# Patient Record
Sex: MALE | Race: WHITE | NOT HISPANIC OR LATINO | Employment: OTHER | URBAN - METROPOLITAN AREA
[De-identification: names, ages, dates, MRNs, and addresses within clinical notes are randomized per-mention and may not be internally consistent; named-entity substitution may affect disease eponyms.]

---

## 2021-01-06 ENCOUNTER — TRANSCRIBE ORDERS (OUTPATIENT)
Dept: PHYSICAL THERAPY | Facility: CLINIC | Age: 61
End: 2021-01-06

## 2021-01-06 DIAGNOSIS — M75.22 BICEPS TENDONITIS OF BOTH SHOULDERS: Primary | ICD-10-CM

## 2021-01-06 DIAGNOSIS — M75.21 BICEPS TENDONITIS OF BOTH SHOULDERS: Primary | ICD-10-CM

## 2021-08-31 ENCOUNTER — OFFICE VISIT (OUTPATIENT)
Dept: PULMONOLOGY | Facility: CLINIC | Age: 61
End: 2021-08-31

## 2021-08-31 VITALS
HEIGHT: 71 IN | WEIGHT: 206 LBS | BODY MASS INDEX: 28.84 KG/M2 | OXYGEN SATURATION: 97 % | DIASTOLIC BLOOD PRESSURE: 90 MMHG | TEMPERATURE: 98.4 F | SYSTOLIC BLOOD PRESSURE: 130 MMHG | HEART RATE: 80 BPM

## 2021-08-31 DIAGNOSIS — R04.2 HEMOPTYSIS: ICD-10-CM

## 2021-08-31 DIAGNOSIS — R05.3 CHRONIC COUGH: Primary | ICD-10-CM

## 2021-08-31 DIAGNOSIS — J98.11 ATELECTASIS: ICD-10-CM

## 2021-08-31 PROCEDURE — 99204 OFFICE O/P NEW MOD 45 MIN: CPT | Performed by: INTERNAL MEDICINE

## 2021-08-31 RX ORDER — ARIPIPRAZOLE 2 MG/1
2 TABLET ORAL EVERY OTHER DAY
COMMUNITY
Start: 2021-08-28

## 2021-08-31 RX ORDER — DULOXETIN HYDROCHLORIDE 60 MG/1
CAPSULE, DELAYED RELEASE ORAL
COMMUNITY

## 2021-08-31 RX ORDER — ATORVASTATIN CALCIUM 40 MG/1
TABLET, FILM COATED ORAL
COMMUNITY
Start: 2021-03-01

## 2021-08-31 RX ORDER — LORAZEPAM 1 MG/1
1 TABLET ORAL DAILY PRN
COMMUNITY
Start: 2021-07-02

## 2021-08-31 RX ORDER — HYDROGEN PEROXIDE 2.65 ML/100ML
81 LIQUID ORAL; TOPICAL DAILY
COMMUNITY
Start: 2021-08-28

## 2021-08-31 RX ORDER — CLOPIDOGREL BISULFATE 75 MG/1
TABLET ORAL
COMMUNITY
Start: 2021-08-29

## 2021-08-31 RX ORDER — LOSARTAN POTASSIUM 100 MG/1
100 TABLET ORAL DAILY
COMMUNITY
Start: 2021-08-23

## 2021-08-31 NOTE — PROGRESS NOTES
Derik Agarwal is a 61 y.o. male here for evaluation of   Chief Complaint   Patient presents with   • atelactasis       Problem list:  1. Hemoptysis  2. Chronic cough  3. CVA, 2021, [facility], no sequelae  4. Hypertension  5. Dyslipidemia  6. Biceps tendon tear, left  7. Depression/anxiety  8. Cataract extraction, right  9. Basal cell cancer, scalp, excision  10. Tonsillectomy  11. [place] teeth extraction  12. Latex allergy, itching and swelling  13. Family history of colon cancer    History of Present Illness    61-year-old gentleman, non-smoker, vaccinated against COVID-19 referred here for a possible episode of hemoptysis.  He reports a chronic productive cough for at least 40 years.  More recently in his sputum has changed to green and yellow but is is most often clear.  He was evaluated by his primary care physician on August 12 at 3 coughed up some cherry colored sputum.  He had 4-5 episodes over a 10-days.  He denies fever, chills, night sweats, weight loss, pleurisy.  He thinks he had some wheezing when he was a kid but he outgrew it.  Even as a child he never used inhalers.  He has not never had wheezing as an adult.  His parents were both smokers so he had secondhand smoke as a child.  He denies TB exposure.  He was raised in [place] and lived in [place] for 20 years and moved to [place] in 2009.  He does not have any children.  He has no history of blood clots.  He does not take any anticoagulants except Plavix since he had a stroke.      Review of Systems   Constitutional: Positive for fatigue. Negative for chills and fever.   HENT: Positive for congestion and postnasal drip. Negative for sore throat and voice change.    Eyes:        Cataract on left   Respiratory: Positive for cough. Negative for wheezing.    Cardiovascular: Negative for chest pain, palpitations and leg swelling.   Gastrointestinal: Positive for diarrhea. Negative for blood in stool.   Endocrine: Negative.    Genitourinary:  Positive for difficulty urinating.   Musculoskeletal: Positive for arthralgias.   Skin: Positive for rash.   Neurological: Positive for dizziness and headaches.   Hematological: Bruises/bleeds easily.   Psychiatric/Behavioral: Positive for dysphoric mood. The patient is nervous/anxious.          Current Outpatient Medications:   •  ARIPiprazole (ABILIFY) 2 MG tablet, Take 2 mg by mouth Every Other Day., Disp: , Rfl:   •  atorvastatin (LIPITOR) 40 MG tablet, , Disp: , Rfl:   •  clopidogrel (PLAVIX) 75 MG tablet, , Disp: , Rfl:   •  DULoxetine (Cymbalta) 60 MG capsule, , Disp: , Rfl:   •  EQ Aspirin Adult Low Dose 81 MG EC tablet, Take 81 mg by mouth Daily., Disp: , Rfl:   •  LORazepam (ATIVAN) 1 MG tablet, Take 1 mg by mouth Daily As Needed., Disp: , Rfl:   •  losartan (COZAAR) 100 MG tablet, Take 100 mg by mouth Daily., Disp: , Rfl:   •  metoprolol tartrate (LOPRESSOR) 25 MG tablet, TAKE 1 2 (ONE HALF) TABLET BY MOUTH TWICE DAILY, Disp: , Rfl:     Past Medical History:   Diagnosis Date   • Anxiety    • Biceps tendon tear, left    • CVA (cerebral vascular accident) (CMS/[facility]) 2021    unmonitor BP, SJE   • Depression    • Hyperlipidemia    • Hypertension      Past Surgical History:   Procedure Laterality Date   • CATARACT EXTRACTION Right    • SKIN CANCER EXCISION      scalp basal cell   • TONSILLECTOMY     • WISDOM TOOTH EXTRACTION       Social History     Socioeconomic History   • Marital status:      Spouse name: Not on file   • Number of children: 0   • Years of education: Not on file   • Highest education level: Not on file   Tobacco Use   • Smoking status: Never Smoker   • Smokeless tobacco: Never Used   Substance and Sexual Activity   • Alcohol use: Yes     Comment: very rare   • Drug use: Never   • Sexual activity: Defer     Family History   Problem Relation Age of Onset   • Hypertension Mother    • Coronary artery disease Mother    • Colon cancer Mother    • Coronary artery disease Father    •  "Hypertension Father    • Stroke Father    • Hypertension Sister    • Hypertension Brother    • Liver cancer Brother    • Lung cancer Brother    • Colon cancer Sister      Blood pressure 130/90, pulse 80, temperature 98.4 °F (36.9 °C), height 180.3 cm (71\"), weight 93.4 kg (206 lb), SpO2 97 %.    Physical Exam  Constitutional:       Appearance: Normal appearance.   HENT:      Head: Normocephalic and atraumatic.      Nose: No congestion.      Mouth/Throat:      Mouth: Mucous membranes are moist.      Pharynx: Oropharynx is clear.   Eyes:      General:         Right eye: No discharge.      Pupils: Pupils are equal, round, and reactive to light.   Neck:      Comments: No palpable thyroid  Cardiovascular:      Rate and Rhythm: Normal rate and regular rhythm.      Pulses: Normal pulses.      Heart sounds: Normal heart sounds. No murmur heard.     Pulmonary:      Effort: Pulmonary effort is normal.      Comments: Rare expiratory rhonchi with forced expiration posterior chest  Abdominal:      Palpations: Abdomen is soft.   Musculoskeletal:      Right lower leg: No edema.      Left lower leg: No edema.   Lymphadenopathy:      Cervical: No cervical adenopathy.   Skin:     General: Skin is warm and dry.   Neurological:      General: No focal deficit present.      Mental Status: He is alert and oriented to person, place, and time.   Psychiatric:         Mood and Affect: Mood normal.           PFTs:    Radiology:  Chest x-ray August 19, 2020 reviewed and lungs are well and related with possible chronic scarring in the left retrocardiac area and some minimal platelike atelectasis at the right costophrenic angle.  Heart is not enlarged and there is no visible adenopathy  Lab:  August 12, 2021, white count 5000, hemoglobin 12.7, hematocrit 39, platelet count 301, 73 polys, free T4 0.9, TSH 3.55, PTT 27, total cholesterol 117, triglycerides 163, elevated, HDL direct 29.6, low    Diagnoses and all orders for this visit:    1. Chronic " cough (Primary)    2. Hemoptysis    3. Atelectasis        Discussion:     61-year-old gentleman referred for possible hemoptysis.  Sputum was described as a cherry colored but not obvious blood.  In the setting of a chronic productive cough most of his adult life I am suspicious that he may have some bronchiectasis.  He does not appear toxic and has not had fever, chills, night sweats, weight loss to suggest a chronic infection..  He is not bringing up half a cup to a cup of sputum per day more like a tablespoon.  Clinically he does not have signs or symptoms to suggest embolic disease.  I do not hear a murmur to suggest mitral valve disease.  Chest x-ray does have some faint bibasilar atelectasis, worse in the left base.  He has had no further episodes in the last week.  I do not feel he  clinically requires antibiotics at this time.  Likewise, he has lived with his chronic cough for 40 years and has not required frequent antibiotics therefore I do not think he has enough trouble mobilizing secretions that he would need an inhaler.    CT scan of the chest for chronic cough and hemoptysis to look at the lower lobes specifically and evaluate for bronchiectasis.  I not see any evidence of a cavity to suggest an aspergilloma.  I do not see a chronic cavitary infiltrate to suggest a granulomatous infection.    Follow-up in 2 weeks after CT scan and at that time depending on what the CAT scan shows I can perform additional tests      Carmen Brewer MD

## 2021-09-01 DIAGNOSIS — R04.2 HEMOPTYSIS: Primary | ICD-10-CM

## 2021-09-01 DIAGNOSIS — R05.3 CHRONIC COUGH: ICD-10-CM

## 2021-09-03 ENCOUNTER — TRANSCRIBE ORDERS (OUTPATIENT)
Dept: PULMONOLOGY | Facility: CLINIC | Age: 61
End: 2021-09-03

## 2021-09-03 RX ORDER — CEPHALEXIN 500 MG/1
500 CAPSULE ORAL 3 TIMES DAILY
Qty: 21 CAPSULE | Refills: 0 | Status: SHIPPED | OUTPATIENT
Start: 2021-09-03 | End: 2021-09-10

## 2021-09-03 NOTE — PROGRESS NOTES
Patient called staff and had several minutes of reddish-brown sputum this morning.  It was not all shruti blood.  Therefore I will call in an antibiotic Keflex 500 mg 3 times daily for 7 days.

## 2021-09-10 DIAGNOSIS — R04.2 HEMOPTYSIS: ICD-10-CM

## 2021-09-22 ENCOUNTER — TELEPHONE (OUTPATIENT)
Dept: PULMONOLOGY | Facility: CLINIC | Age: 61
End: 2021-09-22

## 2021-09-22 NOTE — TELEPHONE ENCOUNTER
Patient called wanting to know results of most recent CT scan, Report is located in Media. -- Please advise.

## 2021-09-28 ENCOUNTER — OFFICE VISIT (OUTPATIENT)
Dept: PULMONOLOGY | Facility: CLINIC | Age: 61
End: 2021-09-28

## 2021-09-28 VITALS
SYSTOLIC BLOOD PRESSURE: 126 MMHG | OXYGEN SATURATION: 94 % | DIASTOLIC BLOOD PRESSURE: 78 MMHG | HEART RATE: 92 BPM | HEIGHT: 71 IN | WEIGHT: 208 LBS | RESPIRATION RATE: 20 BRPM | BODY MASS INDEX: 29.12 KG/M2

## 2021-09-28 DIAGNOSIS — R04.2 HEMOPTYSIS: ICD-10-CM

## 2021-09-28 DIAGNOSIS — J47.9 BRONCHIECTASIS WITHOUT COMPLICATION (HCC): ICD-10-CM

## 2021-09-28 DIAGNOSIS — R05.3 CHRONIC COUGH: Primary | ICD-10-CM

## 2021-09-28 DIAGNOSIS — Z00.6 EXAMINATION FOR NORMAL COMPARISON OR CONTROL IN CLINICAL RESEARCH: Primary | ICD-10-CM

## 2021-09-28 PROBLEM — J98.11 ATELECTASIS: Status: RESOLVED | Noted: 2021-08-31 | Resolved: 2021-09-28

## 2021-09-28 PROCEDURE — 99213 OFFICE O/P EST LOW 20 MIN: CPT | Performed by: INTERNAL MEDICINE

## 2021-09-28 NOTE — PROGRESS NOTES
Derik Agarwal is a 61 y.o. male here for evaluation of   Chief Complaint   Patient presents with   • Cough     follow up       Problem list:  1. Hemoptysis  2. Chronic cough  3. CVA, 2021, [facility], no sequelae  4. Hypertension  5. Dyslipidemia  6. Biceps tendon tear, left  7. Depression/anxiety  8. Cataract extraction, right  9. Basal cell cancer, scalp, excision  10. Tonsillectomy  11. [place] teeth extraction  12. Latex allergy, itching and swelling  13. Family history of colon cancer    History of Present Illness    61-year-old gentleman, non-smoker, vaccinated against COVID-19 referred here for a possible episode of hemoptysis.  He reports a chronic productive cough for at least 40 years.  More recently in his sputum has changed to green and yellow but is is most often clear.  He was evaluated by his primary care physician on August 12, 2021 and coughed up some cherry colored sputum.  He had 4-5 episodes over a 10-days.  He denies fever, chills, night sweats, weight loss, pleurisy.  He thinks he had some wheezing when he was a kid but he outgrew it.  Even as a child he never used inhalers.  He has not never had wheezing as an adult.  His parents were both smokers so he had secondhand smoke as a child.  He denies TB exposure.  He was raised in [place] and lived in [place] for 20 years and moved to [place] in 2009.  He does not have any children.  He has no history of blood clots.  He does not take any anticoagulants except Plavix since he had a stroke.  He then called on September 3 reporting reddish-brown sputum but no shruti blood.  He was given Keflex 500 mg 3 times daily for 7 days.  He underwent a CT scan of the chest at [facility] on September 9, 2021 which revealed no interstitial lung disease, incidental gallstones and an abnormal area in the anterior right perirenal space. Cough improved with antibiotics. Currently has productive cough in the mornings.  Also he has PND.       Review of  Systems   Constitutional: Negative for activity change.   HENT: Positive for congestion and postnasal drip.    Respiratory: Positive for cough.          Current Outpatient Medications:   •  ARIPiprazole (ABILIFY) 2 MG tablet, Take 2 mg by mouth Every Other Day., Disp: , Rfl:   •  atorvastatin (LIPITOR) 40 MG tablet, , Disp: , Rfl:   •  clopidogrel (PLAVIX) 75 MG tablet, , Disp: , Rfl:   •  DULoxetine (Cymbalta) 60 MG capsule, , Disp: , Rfl:   •  EQ Aspirin Adult Low Dose 81 MG EC tablet, Take 81 mg by mouth Daily., Disp: , Rfl:   •  LORazepam (ATIVAN) 1 MG tablet, Take 1 mg by mouth Daily As Needed., Disp: , Rfl:   •  losartan (COZAAR) 100 MG tablet, Take 100 mg by mouth Daily., Disp: , Rfl:   •  metoprolol tartrate (LOPRESSOR) 25 MG tablet, TAKE 1 2 (ONE HALF) TABLET BY MOUTH TWICE DAILY, Disp: , Rfl:     Past Medical History:   Diagnosis Date   • Anxiety    • Biceps tendon tear, left    • CVA (cerebral vascular accident) (CMS/[facility]) 2021    unmonitor BP, SJE   • Depression    • Hyperlipidemia    • Hypertension      Past Surgical History:   Procedure Laterality Date   • CATARACT EXTRACTION Right    • SKIN CANCER EXCISION      scalp basal cell   • TONSILLECTOMY     • WISDOM TOOTH EXTRACTION       Social History     Socioeconomic History   • Marital status:      Spouse name: Not on file   • Number of children: 0   • Years of education: Not on file   • Highest education level: Not on file   Tobacco Use   • Smoking status: Never Smoker   • Smokeless tobacco: Never Used   Substance and Sexual Activity   • Alcohol use: Yes     Comment: very rare   • Drug use: Never   • Sexual activity: Defer     Family History   Problem Relation Age of Onset   • Hypertension Mother    • Coronary artery disease Mother    • Colon cancer Mother    • Coronary artery disease Father    • Hypertension Father    • Stroke Father    • Hypertension Sister    • Hypertension Brother    • Liver cancer Brother    • Lung cancer Brother    • Colon  "cancer Sister      Blood pressure 126/78, pulse 92, resp. rate 20, height 180.3 cm (71\"), weight 94.3 kg (208 lb), SpO2 94 %.    Physical Exam  Constitutional:       Appearance: Normal appearance.   HENT:      Head: Normocephalic and atraumatic.      Nose: No congestion.   Cardiovascular:      Rate and Rhythm: Regular rhythm.      Heart sounds: No murmur heard.     Pulmonary:      Breath sounds: No wheezing or rhonchi.   Musculoskeletal:      Right lower leg: No edema.      Left lower leg: No edema.           PFTs:    Radiology:  Chest x-ray August 19, 2020 reviewed and lungs are well and related with possible chronic scarring in the left retrocardiac area and some minimal platelike atelectasis at the right costophrenic angle.  Heart is not enlarged and there is no visible adenopathy.    I personally reviewed his films and feel he does have some mild bronchiectasis in both lower lobes but no infiltrate or effusion or adenopathy.  I do see the small area anterior to the right kidney but cannot determine what it represents.  It may simply be a loop of bowel on and so that it appears solid.  It is anterior to the kidney but not attached to the kidney.    Lab:  August 12, 2021, white count 5000, hemoglobin 12.7, hematocrit 39, platelet count 301, 73 polys, free T4 0.9, TSH 3.55, PTT 27, total cholesterol 117, triglycerides 163, elevated, HDL direct 29.6, low    Diagnoses and all orders for this visit:    1. Chronic cough (Primary)    2. Bronchiectasis without complication (HCC)    3. Hemoptysis        Discussion:     61-year-old gentleman evaluated for chronic cough and an episode of hemoptysis.  Hemoptysis resolved with Keflex.  He continues to have a morning cough productive of mucoid secretions.  He also has significant history of reflux with previous stricture requiring dilation.  He does not take proton pump inhibitors on a daily basis.  CT scan official reading did not reveal bronchiectasis or interstitial lung " disease.  However I feel his lower lobe bronchi are dilated.  Most likely etiology of his bronchiectasis is reflux with microaspiration.    I discussed starting azithromycin every Monday Wednesday Friday.  However in the past when he is taken this antibiotic it caused terrible diarrhea so he would not like to start this medication.    He does not have wheezing on examination.  He has not yet had pulmonary function testing.  He might benefit from an inhaler if his flows are diminished.  However he seems to mobilize his mucus adequately.    If he develops recurrent hemoptysis he will need bronchoscopy    I am not sure what this lesion anterior to the right kidney represents as you cannot see the full extent of it on the chest CT.  We discussed a CT of the abdomen and pelvis but he would like to wait.    Follow-up in January, full PFTs prior to his visit      Carmen Brewer MD

## 2021-10-07 ENCOUNTER — TELEPHONE (OUTPATIENT)
Dept: PULMONOLOGY | Facility: CLINIC | Age: 61
End: 2021-10-07

## 2021-10-07 DIAGNOSIS — N28.1 RENAL CYST: Primary | ICD-10-CM

## 2021-10-07 NOTE — TELEPHONE ENCOUNTER
I left a voicemail for Mr. Agarwal to have a CTA of the abdomen with IV contrast.  I did place this order today.  If he calls please relay this information.    ----- Message from Carmen Brewer MD sent at 10/6/2021  5:09 PM EDT -----  Leonidas finally talked to radiology.  He needs CTA abdomen with iv contrast.  I called mobile number in chart and got voice mail.

## 2021-10-18 DIAGNOSIS — N28.9 KIDNEY LESION: Primary | ICD-10-CM

## 2021-10-25 ENCOUNTER — TRANSCRIBE ORDERS (OUTPATIENT)
Dept: PULMONOLOGY | Facility: CLINIC | Age: 61
End: 2021-10-25

## 2021-11-03 ENCOUNTER — TRANSCRIBE ORDERS (OUTPATIENT)
Dept: PULMONOLOGY | Facility: CLINIC | Age: 61
End: 2021-11-03

## 2021-11-12 DIAGNOSIS — N28.9 KIDNEY LESION: ICD-10-CM

## 2021-11-15 DIAGNOSIS — N28.9 KIDNEY LESION: Primary | ICD-10-CM

## 2022-01-10 ENCOUNTER — TELEPHONE (OUTPATIENT)
Dept: UROLOGY | Facility: CLINIC | Age: 62
End: 2022-01-10

## 2022-01-10 ENCOUNTER — OFFICE VISIT (OUTPATIENT)
Dept: UROLOGY | Facility: CLINIC | Age: 62
End: 2022-01-10

## 2022-01-10 VITALS — WEIGHT: 208 LBS | BODY MASS INDEX: 29.12 KG/M2 | HEART RATE: 96 BPM | OXYGEN SATURATION: 99 % | HEIGHT: 71 IN

## 2022-01-10 DIAGNOSIS — N28.9 KIDNEY LESION: Primary | ICD-10-CM

## 2022-01-10 LAB
BILIRUB BLD-MCNC: NEGATIVE MG/DL
CLARITY, POC: CLEAR
COLOR UR: YELLOW
EXPIRATION DATE: NORMAL
GLUCOSE UR STRIP-MCNC: NEGATIVE MG/DL
KETONES UR QL: NEGATIVE
LEUKOCYTE EST, POC: NEGATIVE
Lab: NORMAL
NITRITE UR-MCNC: NEGATIVE MG/ML
PH UR: 6 [PH] (ref 5–8)
PROT UR STRIP-MCNC: NEGATIVE MG/DL
RBC # UR STRIP: NEGATIVE /UL
SP GR UR: 1.02 (ref 1–1.03)
UROBILINOGEN UR QL: NORMAL

## 2022-01-10 PROCEDURE — 99204 OFFICE O/P NEW MOD 45 MIN: CPT | Performed by: UROLOGY

## 2022-01-10 PROCEDURE — 81003 URINALYSIS AUTO W/O SCOPE: CPT | Performed by: UROLOGY

## 2022-01-10 RX ORDER — TRAZODONE HYDROCHLORIDE 100 MG/1
TABLET ORAL
COMMUNITY

## 2022-01-10 NOTE — PROGRESS NOTES
Office Visit [facility]      Patient Name: Derik Agarwal  : 1960   MRN: [id number]     Chief Complaint: Renal cyst    Referring Provider: Arianna Rosenbaum APRN    History of Present Illness: Derik Agarwal is a 61 y.o. male who presents to Urology today for evaluation of bilateral renal cyst.  The patient has a past medical history significant for CVA in 3/2021, anxiety, depression, hypertension, hyperlipidemia.  The patient was recently evaluated by pulmonary care, CT imaging demonstrated renal cyst, seen in superior portion of CT chest.  He then underwent renal ultrasound which demonstrates bilateral renal cyst, question of 2.5 cm right cyst with complex features.  Presents to urology for evaluation.  He denies flank pain.  He denies lower urinary tract symptoms.  Denies hematuria or history of urinary tract infection.  Denies prior urologic evaluation or instrumentation.  Denies family history of  malignancy.  Patient is a never smoker.      Subjective      Review of System: Review of Systems   Constitutional: Negative for chills, fatigue, fever and unexpected weight change.   HENT: Negative for sore throat.    Eyes: Negative for visual disturbance.   Respiratory: Negative for cough, chest tightness and shortness of breath.    Cardiovascular: Negative for chest pain and leg swelling.   Gastrointestinal: Negative for blood in stool, constipation, diarrhea, nausea, rectal pain and vomiting.   Genitourinary: Negative for decreased urine volume, difficulty urinating, dysuria, enuresis, flank pain, frequency, genital sores, hematuria and urgency.   Musculoskeletal: Negative for back pain and joint swelling.   Skin: Negative for rash and wound.   Neurological: Negative for seizures, speech difficulty, weakness and headaches.   Psychiatric/Behavioral: Negative for confusion, sleep disturbance and suicidal ideas. The patient is not nervous/anxious.       I have reviewed the ROS documented by my  clinical staff, updated appropriately and I agree. Matt Gan MD    Past Medical History:   Past Medical History:   Diagnosis Date   • Anxiety    • Biceps tendon tear, left    • CVA (cerebral vascular accident) (HCC) 2021    unmonitor BP, SJE   • Depression    • Hyperlipidemia    • Hypertension        Past Surgical History:   Past Surgical History:   Procedure Laterality Date   • CATARACT EXTRACTION Right    • SKIN CANCER EXCISION      scalp basal cell   • TONSILLECTOMY     • WISDOM TOOTH EXTRACTION         Family History:   Family History   Problem Relation Age of Onset   • Hypertension Mother    • Coronary artery disease Mother    • Colon cancer Mother    • Coronary artery disease Father    • Hypertension Father    • Stroke Father    • Hypertension Sister    • Hypertension Brother    • Liver cancer Brother    • Lung cancer Brother    • Colon cancer Sister        Social History:   Social History     Socioeconomic History   • Marital status:    • Number of children: 0   Tobacco Use   • Smoking status: Never Smoker   • Smokeless tobacco: Never Used   Substance and Sexual Activity   • Alcohol use: Yes     Comment: very rare   • Drug use: Never   • Sexual activity: Defer       Medications:     Current Outpatient Medications:   •  ARIPiprazole (ABILIFY) 2 MG tablet, Take 2 mg by mouth Every Other Day., Disp: , Rfl:   •  atorvastatin (LIPITOR) 40 MG tablet, , Disp: , Rfl:   •  clopidogrel (PLAVIX) 75 MG tablet, , Disp: , Rfl:   •  DULoxetine (Cymbalta) 60 MG capsule, , Disp: , Rfl:   •  EQ Aspirin Adult Low Dose 81 MG EC tablet, Take 81 mg by mouth Daily., Disp: , Rfl:   •  LORazepam (ATIVAN) 1 MG tablet, Take 1 mg by mouth Daily As Needed., Disp: , Rfl:   •  losartan (COZAAR) 100 MG tablet, Take 100 mg by mouth Daily., Disp: , Rfl:   •  metoprolol tartrate (LOPRESSOR) 25 MG tablet, , Disp: , Rfl:   •  traZODone (DESYREL) 100 MG tablet, trazodone hydrochloride 100 mg tabs, Disp: , Rfl:     Allergies:  "  Allergies   Allergen Reactions   • Latex Itching and Swelling           Objective     Physical Exam:   Vital Signs:   Vitals:    01/10/22 1413   Pulse: 96   SpO2: 99%   Weight: 94.3 kg (208 lb)   Height: 180.3 cm (70.98\")   PainSc: 0-No pain     Body mass index is 29.02 kg/m².     Physical Exam  Vitals and nursing note reviewed.   Constitutional:       Appearance: Normal appearance. He is normal weight.   Cardiovascular:      Comments: Well-perfused  Pulmonary:      Effort: Pulmonary effort is normal.   Abdominal:      General: Abdomen is flat.      Palpations: Abdomen is soft.   Musculoskeletal:         General: Normal range of motion.   Skin:     General: Skin is warm and dry.   Neurological:      General: No focal deficit present.      Mental Status: He is alert and oriented to person, place, and time. Mental status is at baseline.   Psychiatric:         Mood and Affect: Mood normal.         Behavior: Behavior normal.         Thought Content: Thought content normal.         Judgment: Judgment normal.         Labs:   Brief Urine Lab Results  (Last result in the past 365 days)      Color   Clarity   Blood   Leuk Est   Nitrite   Protein   CREAT   Urine HCG        01/10/22 1434 Yellow   Clear   Negative   Negative   Negative   Negative                      No results found for: GLUCOSE, CALCIUM, NA, K, CO2, CL, BUN, CREATININE, EGFRIFAFRI, EGFRIFNONA, BCR, ANIONGAP    No results found for: WBC, HGB, HCT, MCV, PLT    Images:   Personal review of renal ultrasound report from 11/2021.  Multiple bilateral renal cysts, simple.  Cystic lesion within the right kidney, 2.5 cm with possible complex features.    Measures:   Tobacco:   Derik Agarwal  reports that he has never smoked. He has never used smokeless tobacco.. I have educated him on the risk of diseases from using tobacco products such as  malignancy.    Assessment / Plan      Assessment/Plan:   61 y.o. male is here today for evaluation of bilateral simple renal " cyst, right 2.5 cm complex cyst.  No significant lower urinary tract symptoms.  No history of hematuria or urinary tract infection.  Never smoker, no family history of  malignancy.  Patient denies flank pain.  Systemic symptoms including nausea, vomiting, fever, chills.    Diagnoses and all orders for this visit:    1. Kidney lesion (Primary)  -     POC Urinalysis Dipstick, Automated  -     CT Abdomen Pelvis With & Without Contrast; Future       Patient education:  Today we discussed etiology and management of renal cyst.  We discussed the report of his renal ultrasound.  I discussed that I am not able to visualize the disc but did visualize report.  Discussed the importance of follow-up imaging in approximately 3 months with CT abdomen and pelvis with contrast to more formally evaluate renal lesion, complex cyst.  Discussed renal ultrasound which reports bilateral renal cyst, possible right complex cyst.  We discussed the size of the lesion is less than 4 cm, 2.5 cm in greatest dimension.  We discussed management of small renal lesions.  We discussed renal lesions are often found incidentally.  Discussed that based upon the size and characteristics reported on ultrasound a 3-month follow-up is warranted.  Patient is understanding agreeable plan of care.    Follow Up:   Return in about 3 months (around 4/10/2022) for Follow up after Imaging.    I spent 45 minutes providing clinical care for this patient; including review of patient's chart and provider documentation, face to face time spent with patient in examination room (obtaining history, performing physical exam, discussing diagnosis and management options), placing orders, and completing patient documentation.     Matt Gan MD  [facility] [place]

## 2022-01-11 ENCOUNTER — PATIENT ROUNDING (BHMG ONLY) (OUTPATIENT)
Dept: UROLOGY | Facility: CLINIC | Age: 62
End: 2022-01-11

## 2022-01-11 NOTE — PROGRESS NOTES
A ADARTIS message has been sent to the patient for PATIENT ROUNDING with [facility].

## 2022-04-04 ENCOUNTER — TELEPHONE (OUTPATIENT)
Dept: UROLOGY | Facility: CLINIC | Age: 62
End: 2022-04-04

## 2022-04-04 NOTE — TELEPHONE ENCOUNTER
Caller: FLORES GAFFNEY    Relationship: SELF    Best call back number: [phone]      How would you like to receive the form or medical records (pick-up, mail, fax): FAX  If fax, what is the fax number: [phone]    [facility]       Additional notes:     PT NEEDS CT SCAN PERFORMED FOR F/U APPT W/ DR. VALDERRAMA BUT IS HAVING ISSUES GETTING HIS INSURANCE COMPANY TO APPROVE CT SCAN. PT STATED INSURANCE COMPANY STATED CT SCAN IS NOT 'MEDICALLY NECESSARY.' REQUESTING DOCUMENT/PA FOR MEDICAL NECESSITY SENT TO INSURANCE COMPANY IF POSSIBLE.      PLEASE REACH OUT TO PT FOR ANY QUESTIONS.